# Patient Record
Sex: FEMALE | Race: WHITE | NOT HISPANIC OR LATINO
[De-identification: names, ages, dates, MRNs, and addresses within clinical notes are randomized per-mention and may not be internally consistent; named-entity substitution may affect disease eponyms.]

---

## 2018-09-14 ENCOUNTER — LABORATORY RESULT (OUTPATIENT)
Age: 38
End: 2018-09-14

## 2018-09-14 ENCOUNTER — APPOINTMENT (OUTPATIENT)
Dept: FAMILY MEDICINE | Facility: CLINIC | Age: 38
End: 2018-09-14
Payer: COMMERCIAL

## 2018-09-14 VITALS
OXYGEN SATURATION: 97 % | TEMPERATURE: 98.6 F | BODY MASS INDEX: 32.59 KG/M2 | WEIGHT: 198 LBS | HEART RATE: 84 BPM | RESPIRATION RATE: 14 BRPM | HEIGHT: 65.5 IN | DIASTOLIC BLOOD PRESSURE: 64 MMHG | SYSTOLIC BLOOD PRESSURE: 108 MMHG

## 2018-09-14 DIAGNOSIS — Z82.49 FAMILY HISTORY OF ISCHEMIC HEART DISEASE AND OTHER DISEASES OF THE CIRCULATORY SYSTEM: ICD-10-CM

## 2018-09-14 DIAGNOSIS — Z80.7 FAMILY HISTORY OF OTHER MALIGNANT NEOPLASMS OF LYMPHOID, HEMATOPOIETIC AND RELATED TISSUES: ICD-10-CM

## 2018-09-14 PROCEDURE — 99385 PREV VISIT NEW AGE 18-39: CPT

## 2018-09-14 NOTE — HEALTH RISK ASSESSMENT
[Patient reported mammogram was normal] : Patient reported mammogram was normal [Patient reported PAP Smear was normal] : Patient reported PAP Smear was normal [MammogramDate] : 08/18 [PapSmearDate] : 05/18

## 2018-09-14 NOTE — ASSESSMENT
[FreeTextEntry1] : hx iron defic anemia, family hx thalassemia - check cbc, iron studies, hemoglobin electrophoresis r/o thalassemia\par hx elev LFTs - check LFTs\par obesity - Advised lifestyle modifications for wt loss. Healthy diet and regular exercise regimen discussed w/ pt\par Healthy diet and regular exercise regimen discussed w/ pt.\par Screening labs ordered\par flu vaccine in the fall recommended\par Advised routine pap smear and mammogram\par Any questions call office

## 2018-09-14 NOTE — HISTORY OF PRESENT ILLNESS
[de-identified] : New patient to establish care. Pt in office for CPE. Pt has hx of phlebitis in LLE, s/p ablation of vein in L thigh 2017. Pt has been feeling well. Denies CP, palpitations, dyspnea, n/v. Pt has hx of iron defic anemia. Pt has hx of thalassemia in family but would like to be tested for it.\par Nonsmoker\par ETOH use social\par Drug use denies\par Exercises irregularly walking/yoga\par Diet poor high carb/portions\par Works as  [email or web address] \par , has 3 children

## 2018-09-17 LAB
25(OH)D3 SERPL-MCNC: 28.9 NG/ML
ALBUMIN SERPL ELPH-MCNC: 4.4 G/DL
ALP BLD-CCNC: 70 U/L
ALT SERPL-CCNC: 30 U/L
ANION GAP SERPL CALC-SCNC: 13 MMOL/L
APPEARANCE: CLEAR
AST SERPL-CCNC: 31 U/L
BACTERIA: NEGATIVE
BASOPHILS # BLD AUTO: 0.1 K/UL
BASOPHILS NFR BLD AUTO: 1.5 %
BILIRUB SERPL-MCNC: <0.2 MG/DL
BILIRUBIN URINE: NEGATIVE
BLOOD URINE: NEGATIVE
BUN SERPL-MCNC: 11 MG/DL
CALCIUM SERPL-MCNC: 9.3 MG/DL
CHLORIDE SERPL-SCNC: 103 MMOL/L
CHOLEST SERPL-MCNC: 172 MG/DL
CHOLEST/HDLC SERPL: 4.4 RATIO
CO2 SERPL-SCNC: 26 MMOL/L
COLOR: YELLOW
CREAT SERPL-MCNC: 0.67 MG/DL
EOSINOPHIL # BLD AUTO: 0.19 K/UL
EOSINOPHIL NFR BLD AUTO: 2.9 %
FERRITIN SERPL-MCNC: 4 NG/ML
FOLATE SERPL-MCNC: 11.6 NG/ML
GLUCOSE QUALITATIVE U: NEGATIVE MG/DL
GLUCOSE SERPL-MCNC: 83 MG/DL
HBA1C MFR BLD HPLC: 5.4 %
HCT VFR BLD CALC: 29.8 %
HDLC SERPL-MCNC: 39 MG/DL
HGB A MFR BLD: 98.1 %
HGB A2 MFR BLD: 1.9 %
HGB BLD-MCNC: 8.6 G/DL
HGB FRACT BLD-IMP: NORMAL
HYALINE CASTS: 2 /LPF
IMM GRANULOCYTES NFR BLD AUTO: 0.2 %
IRON SATN MFR SERPL: 4 %
IRON SERPL-MCNC: 17 UG/DL
KETONES URINE: NEGATIVE
LDLC SERPL CALC-MCNC: 108 MG/DL
LEUKOCYTE ESTERASE URINE: NEGATIVE
LYMPHOCYTES # BLD AUTO: 2.16 K/UL
LYMPHOCYTES NFR BLD AUTO: 33.1 %
MAN DIFF?: NORMAL
MCHC RBC-ENTMCNC: 20.4 PG
MCHC RBC-ENTMCNC: 28.9 GM/DL
MCV RBC AUTO: 70.6 FL
MICROSCOPIC-UA: NORMAL
MONOCYTES # BLD AUTO: 0.54 K/UL
MONOCYTES NFR BLD AUTO: 8.3 %
NEUTROPHILS # BLD AUTO: 3.53 K/UL
NEUTROPHILS NFR BLD AUTO: 54 %
NITRITE URINE: NEGATIVE
PH URINE: 5.5
PLATELET # BLD AUTO: 413 K/UL
POTASSIUM SERPL-SCNC: 4.3 MMOL/L
PROT SERPL-MCNC: 7.5 G/DL
PROTEIN URINE: NEGATIVE MG/DL
RBC # BLD: 4.22 M/UL
RBC # FLD: 17.4 %
RED BLOOD CELLS URINE: 2 /HPF
SODIUM SERPL-SCNC: 142 MMOL/L
SPECIFIC GRAVITY URINE: 1.01
SQUAMOUS EPITHELIAL CELLS: 20 /HPF
T4 FREE SERPL-MCNC: 1.1 NG/DL
TIBC SERPL-MCNC: 465 UG/DL
TRIGL SERPL-MCNC: 123 MG/DL
TSH SERPL-ACNC: 1.89 UIU/ML
UIBC SERPL-MCNC: 448 UG/DL
UROBILINOGEN URINE: NEGATIVE MG/DL
VIT B12 SERPL-MCNC: 584 PG/ML
WBC # FLD AUTO: 6.53 K/UL
WHITE BLOOD CELLS URINE: 5 /HPF

## 2019-07-08 ENCOUNTER — TRANSCRIPTION ENCOUNTER (OUTPATIENT)
Age: 39
End: 2019-07-08

## 2020-03-09 ENCOUNTER — RESULT REVIEW (OUTPATIENT)
Age: 40
End: 2020-03-09

## 2021-03-23 ENCOUNTER — NON-APPOINTMENT (OUTPATIENT)
Age: 41
End: 2021-03-23

## 2021-03-23 ENCOUNTER — APPOINTMENT (OUTPATIENT)
Dept: FAMILY MEDICINE | Facility: CLINIC | Age: 41
End: 2021-03-23
Payer: COMMERCIAL

## 2021-03-23 VITALS
HEIGHT: 65.5 IN | OXYGEN SATURATION: 98 % | SYSTOLIC BLOOD PRESSURE: 110 MMHG | WEIGHT: 208 LBS | BODY MASS INDEX: 34.24 KG/M2 | TEMPERATURE: 98 F | DIASTOLIC BLOOD PRESSURE: 70 MMHG | HEART RATE: 84 BPM

## 2021-03-23 PROCEDURE — 99396 PREV VISIT EST AGE 40-64: CPT | Mod: 25

## 2021-03-23 PROCEDURE — 96127 BRIEF EMOTIONAL/BEHAV ASSMT: CPT

## 2021-03-23 PROCEDURE — 99072 ADDL SUPL MATRL&STAF TM PHE: CPT

## 2021-03-23 PROCEDURE — 93000 ELECTROCARDIOGRAM COMPLETE: CPT | Mod: 59

## 2021-03-23 NOTE — ASSESSMENT
[FreeTextEntry1] : fe defic anemia - Check labs\par varicose veins - refer to vascular physician. advised use of compression stockings\par obesity - Advised lifestyle modifications for wt loss. Healthy diet and regular exercise regimen discussed w/ pt\par Healthy diet and regular exercise regimen discussed w/ pt.\par Screening labs ordered\par Advised routine pap smear and mammogram\par Any questions call office

## 2021-03-23 NOTE — HISTORY OF PRESENT ILLNESS
[de-identified] : Pt in office for CPE. Pt has been feeling well. Denies CP, palpitations, dyspnea, n/v. Pt has hx of iron defic anemia. Pt has hx of thalassemia in family. Hemoglobin electrophoresis showed decreased expression of hb A2 consistent w/ alpha thalassemia trait but could also be 2/2 her severe iron defic at that time.\par Pt has hx of phlebitis in LLE, s/p ablation of vein in L thigh 2017. Pt has varicose veins that she would like evaluated w/ a vascular dr.\par Nonsmoker\par ETOH use social\par Drug use denies\par Exercises regularly walking\par Diet poor high carb/portions/snacks\par Works as  [employer]\par , has 3 children

## 2021-03-23 NOTE — HEALTH RISK ASSESSMENT
[Patient reported mammogram was normal] : Patient reported mammogram was normal [Patient reported PAP Smear was normal] : Patient reported PAP Smear was normal [MammogramDate] : 03/20 [PapSmearDate] : 03/21

## 2021-03-26 ENCOUNTER — TRANSCRIPTION ENCOUNTER (OUTPATIENT)
Age: 41
End: 2021-03-26

## 2021-03-26 LAB
ALBUMIN SERPL ELPH-MCNC: 4.2 G/DL
ALP BLD-CCNC: 99 U/L
ALT SERPL-CCNC: 39 U/L
ANION GAP SERPL CALC-SCNC: 8 MMOL/L
AST SERPL-CCNC: 25 U/L
BASOPHILS # BLD AUTO: 0.08 K/UL
BASOPHILS NFR BLD AUTO: 1.3 %
BILIRUB SERPL-MCNC: <0.2 MG/DL
BUN SERPL-MCNC: 14 MG/DL
CALCIUM SERPL-MCNC: 8.9 MG/DL
CHLORIDE SERPL-SCNC: 106 MMOL/L
CHOLEST SERPL-MCNC: 156 MG/DL
CO2 SERPL-SCNC: 25 MMOL/L
CREAT SERPL-MCNC: 0.65 MG/DL
EOSINOPHIL # BLD AUTO: 0.25 K/UL
EOSINOPHIL NFR BLD AUTO: 4.1 %
ESTIMATED AVERAGE GLUCOSE: 120 MG/DL
FERRITIN SERPL-MCNC: 5 NG/ML
GLUCOSE SERPL-MCNC: 87 MG/DL
HBA1C MFR BLD HPLC: 5.8 %
HCT VFR BLD CALC: 31.1 %
HDLC SERPL-MCNC: 30 MG/DL
HGB BLD-MCNC: 9.2 G/DL
IMM GRANULOCYTES NFR BLD AUTO: 0.3 %
IRON SATN MFR SERPL: 5 %
IRON SERPL-MCNC: 19 UG/DL
LDLC SERPL CALC-MCNC: 89 MG/DL
LYMPHOCYTES # BLD AUTO: 1.95 K/UL
LYMPHOCYTES NFR BLD AUTO: 31.8 %
MAN DIFF?: NORMAL
MCHC RBC-ENTMCNC: 21.7 PG
MCHC RBC-ENTMCNC: 29.6 GM/DL
MCV RBC AUTO: 73.5 FL
MONOCYTES # BLD AUTO: 0.47 K/UL
MONOCYTES NFR BLD AUTO: 7.7 %
NEUTROPHILS # BLD AUTO: 3.36 K/UL
NEUTROPHILS NFR BLD AUTO: 54.8 %
NONHDLC SERPL-MCNC: 126 MG/DL
PLATELET # BLD AUTO: 352 K/UL
POTASSIUM SERPL-SCNC: 4.4 MMOL/L
PROT SERPL-MCNC: 6.6 G/DL
RBC # BLD: 4.23 M/UL
RBC # FLD: 16.8 %
SODIUM SERPL-SCNC: 139 MMOL/L
T4 FREE SERPL-MCNC: 1 NG/DL
TIBC SERPL-MCNC: 395 UG/DL
TRIGL SERPL-MCNC: 188 MG/DL
TSH SERPL-ACNC: 2.03 UIU/ML
UIBC SERPL-MCNC: 376 UG/DL
WBC # FLD AUTO: 6.13 K/UL

## 2021-04-09 ENCOUNTER — EMERGENCY (EMERGENCY)
Facility: HOSPITAL | Age: 41
LOS: 1 days | Discharge: HOME | End: 2021-04-09
Attending: EMERGENCY MEDICINE | Admitting: EMERGENCY MEDICINE
Payer: COMMERCIAL

## 2021-04-09 VITALS
HEART RATE: 73 BPM | SYSTOLIC BLOOD PRESSURE: 115 MMHG | OXYGEN SATURATION: 97 % | RESPIRATION RATE: 16 BRPM | TEMPERATURE: 99 F | DIASTOLIC BLOOD PRESSURE: 78 MMHG

## 2021-04-09 VITALS
TEMPERATURE: 98 F | RESPIRATION RATE: 16 BRPM | OXYGEN SATURATION: 99 % | SYSTOLIC BLOOD PRESSURE: 116 MMHG | HEIGHT: 65 IN | DIASTOLIC BLOOD PRESSURE: 79 MMHG | WEIGHT: 199.96 LBS | HEART RATE: 78 BPM

## 2021-04-09 DIAGNOSIS — Z98.89 OTHER SPECIFIED POSTPROCEDURAL STATES: Chronic | ICD-10-CM

## 2021-04-09 DIAGNOSIS — Q51.2 OTHER DOUBLING OF UTERUS: Chronic | ICD-10-CM

## 2021-04-09 DIAGNOSIS — Z87.42 PERSONAL HISTORY OF OTHER DISEASES OF THE FEMALE GENITAL TRACT: Chronic | ICD-10-CM

## 2021-04-09 LAB — HCG UR QL: NEGATIVE — SIGNIFICANT CHANGE UP

## 2021-04-09 PROCEDURE — 71046 X-RAY EXAM CHEST 2 VIEWS: CPT | Mod: 26

## 2021-04-09 PROCEDURE — 71046 X-RAY EXAM CHEST 2 VIEWS: CPT

## 2021-04-09 PROCEDURE — 72125 CT NECK SPINE W/O DYE: CPT | Mod: 26

## 2021-04-09 PROCEDURE — 72100 X-RAY EXAM L-S SPINE 2/3 VWS: CPT

## 2021-04-09 PROCEDURE — 72100 X-RAY EXAM L-S SPINE 2/3 VWS: CPT | Mod: 26

## 2021-04-09 PROCEDURE — 70450 CT HEAD/BRAIN W/O DYE: CPT | Mod: 26

## 2021-04-09 PROCEDURE — 81025 URINE PREGNANCY TEST: CPT

## 2021-04-09 PROCEDURE — 70450 CT HEAD/BRAIN W/O DYE: CPT

## 2021-04-09 PROCEDURE — 99285 EMERGENCY DEPT VISIT HI MDM: CPT

## 2021-04-09 PROCEDURE — 99284 EMERGENCY DEPT VISIT MOD MDM: CPT | Mod: 25

## 2021-04-09 PROCEDURE — 72125 CT NECK SPINE W/O DYE: CPT

## 2021-04-09 RX ORDER — IBUPROFEN 200 MG
600 TABLET ORAL ONCE
Refills: 0 | Status: COMPLETED | OUTPATIENT
Start: 2021-04-09 | End: 2021-04-09

## 2021-04-09 RX ADMIN — Medication 600 MILLIGRAM(S): at 16:00
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
## 2021-04-09 NOTE — ED PROVIDER NOTE - PATIENT PORTAL LINK FT
You can access the FollowMyHealth Patient Portal offered by [facility] by registering at the following website: [email or web address]. By joining [facility]’s FollowMyHealth portal, you will also be able to view your health information using other applications (apps) compatible with our system.
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
## 2021-04-09 NOTE — ED ADULT NURSE NOTE - PSH
H/O  section  2012  H/O one miscarriage  DVC 14 weeks gestation  H/O sinus surgery    H/O toe surgery  (L) great toe  Septate uterus
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
## 2021-04-09 NOTE — ED PROVIDER NOTE - CARE PROVIDER_API CALL
Hussain Salamanca  ORTHOPAEDIC SURGERY  [address] [facility], [address] [place] [address]  Phone: [phone]  Fax: [phone]  Follow Up Time: 1-3 Days
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
## 2021-04-20 ENCOUNTER — NON-APPOINTMENT (OUTPATIENT)
Age: 41
End: 2021-04-20

## 2021-04-20 ENCOUNTER — APPOINTMENT (OUTPATIENT)
Dept: FAMILY MEDICINE | Facility: CLINIC | Age: 41
End: 2021-04-20
Payer: COMMERCIAL

## 2021-04-20 VITALS
HEART RATE: 96 BPM | BODY MASS INDEX: 32.92 KG/M2 | TEMPERATURE: 98 F | SYSTOLIC BLOOD PRESSURE: 112 MMHG | DIASTOLIC BLOOD PRESSURE: 72 MMHG | WEIGHT: 200 LBS | OXYGEN SATURATION: 98 % | HEIGHT: 65.5 IN

## 2021-04-20 DIAGNOSIS — S13.4XXA SPRAIN OF LIGAMENTS OF CERVICAL SPINE, INITIAL ENCOUNTER: ICD-10-CM

## 2021-04-20 DIAGNOSIS — M54.2 CERVICALGIA: ICD-10-CM

## 2021-04-20 PROCEDURE — 99214 OFFICE O/P EST MOD 30 MIN: CPT

## 2021-04-20 PROCEDURE — 99213 OFFICE O/P EST LOW 20 MIN: CPT

## 2021-04-20 PROCEDURE — 99072 ADDL SUPL MATRL&STAF TM PHE: CPT
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
## 2021-04-20 NOTE — PHYSICAL EXAM
[No Lymphadenopathy] : no lymphadenopathy [Supple] : supple [No Focal Deficits] : no focal deficits [Grossly Normal Strength/Tone] : grossly normal strength/tone [Normal] : affect was normal and insight and judgment were intact [de-identified] : paraspinal muscle spasms L>R cervical. no cervical tenderness. neg spurling manuevers
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
## 2021-04-20 NOTE — PHYSICAL EXAM
[No Lymphadenopathy] : no lymphadenopathy [Supple] : supple [Normal] : affect was normal and insight and judgment were intact
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
## 2021-04-23 ENCOUNTER — APPOINTMENT (OUTPATIENT)
Dept: ULTRASOUND IMAGING | Facility: CLINIC | Age: 41
End: 2021-04-23
Payer: COMMERCIAL

## 2021-04-23 ENCOUNTER — OUTPATIENT (OUTPATIENT)
Dept: OUTPATIENT SERVICES | Facility: HOSPITAL | Age: 41
LOS: 1 days | End: 2021-04-23
Payer: COMMERCIAL

## 2021-04-23 DIAGNOSIS — Q51.2 OTHER DOUBLING OF UTERUS: Chronic | ICD-10-CM

## 2021-04-23 DIAGNOSIS — Z98.89 OTHER SPECIFIED POSTPROCEDURAL STATES: Chronic | ICD-10-CM

## 2021-04-23 DIAGNOSIS — Z00.8 ENCOUNTER FOR OTHER GENERAL EXAMINATION: ICD-10-CM

## 2021-04-23 DIAGNOSIS — Z87.42 PERSONAL HISTORY OF OTHER DISEASES OF THE FEMALE GENITAL TRACT: Chronic | ICD-10-CM

## 2021-04-23 PROCEDURE — 76536 US EXAM OF HEAD AND NECK: CPT | Mod: 26

## 2021-04-23 PROCEDURE — 76536 US EXAM OF HEAD AND NECK: CPT

## 2021-12-22 ENCOUNTER — TRANSCRIPTION ENCOUNTER (OUTPATIENT)
Age: 41
End: 2021-12-22

## 2022-05-27 ENCOUNTER — NON-APPOINTMENT (OUTPATIENT)
Age: 42
End: 2022-05-27

## 2022-08-02 ENCOUNTER — APPOINTMENT (OUTPATIENT)
Dept: FAMILY MEDICINE | Facility: CLINIC | Age: 42
End: 2022-08-02

## 2022-08-05 ENCOUNTER — NON-APPOINTMENT (OUTPATIENT)
Age: 42
End: 2022-08-05

## 2022-08-05 ENCOUNTER — APPOINTMENT (OUTPATIENT)
Dept: FAMILY MEDICINE | Facility: CLINIC | Age: 42
End: 2022-08-05

## 2022-08-05 VITALS
HEART RATE: 80 BPM | OXYGEN SATURATION: 98 % | TEMPERATURE: 97.6 F | HEIGHT: 65.5 IN | SYSTOLIC BLOOD PRESSURE: 110 MMHG | BODY MASS INDEX: 33.91 KG/M2 | WEIGHT: 206 LBS | DIASTOLIC BLOOD PRESSURE: 74 MMHG

## 2022-08-05 DIAGNOSIS — Z00.00 ENCOUNTER FOR GENERAL ADULT MEDICAL EXAMINATION W/OUT ABNORMAL FINDINGS: ICD-10-CM

## 2022-08-05 DIAGNOSIS — Z13.29 ENCOUNTER FOR SCREENING FOR OTHER SUSPECTED ENDOCRINE DISORDER: ICD-10-CM

## 2022-08-05 DIAGNOSIS — I83.93 ASYMPTOMATIC VARICOSE VEINS OF BILATERAL LOWER EXTREMITIES: ICD-10-CM

## 2022-08-05 DIAGNOSIS — Z13.228 ENCOUNTER FOR SCREENING FOR OTHER SUSPECTED ENDOCRINE DISORDER: ICD-10-CM

## 2022-08-05 DIAGNOSIS — Z13.220 ENCOUNTER FOR SCREENING FOR LIPOID DISORDERS: ICD-10-CM

## 2022-08-05 DIAGNOSIS — Z13.0 ENCOUNTER FOR SCREENING FOR OTHER SUSPECTED ENDOCRINE DISORDER: ICD-10-CM

## 2022-08-05 DIAGNOSIS — R79.89 OTHER SPECIFIED ABNORMAL FINDINGS OF BLOOD CHEMISTRY: ICD-10-CM

## 2022-08-05 PROCEDURE — 93000 ELECTROCARDIOGRAM COMPLETE: CPT

## 2022-08-05 PROCEDURE — 99396 PREV VISIT EST AGE 40-64: CPT | Mod: 25

## 2022-08-05 RX ORDER — CYCLOBENZAPRINE HYDROCHLORIDE 5 MG/1
5 TABLET, FILM COATED ORAL
Qty: 20 | Refills: 0 | Status: COMPLETED | COMMUNITY
Start: 2021-04-20 | End: 2022-08-05

## 2022-08-08 NOTE — HEALTH RISK ASSESSMENT
[Patient reported mammogram was normal] : Patient reported mammogram was normal [Patient reported PAP Smear was normal] : Patient reported PAP Smear was normal [MammogramDate] : 05/21 [PapSmearDate] : 05/21

## 2022-08-08 NOTE — ASSESSMENT
[FreeTextEntry1] : fe defic anemia - Check labs\par varicose veins - refer to vascular physician. advised use of compression stockings\par thyroid nodule - f/u with endo\par obesity - Advised lifestyle modifications for wt loss. Healthy diet and regular exercise regimen discussed w/ pt\par Healthy diet and regular exercise regimen discussed w/ pt. start qsymia q day. Possible adverse effects discussed with pt. f/u in 1 month\par Screening labs ordered\par Advised routine pap smear and mammogram\par Any questions call office

## 2022-08-08 NOTE — HISTORY OF PRESENT ILLNESS
[de-identified] : Pt in office for CPE. Pt has been feeling well. Denies CP, palpitations, dyspnea, n/v. Pt has hx of iron defic anemia and thalassemia. Hemoglobin electrophoresis showed decreased expression of hb A2 consistent w/ alpha thalassemia trait but could also be 2/2 her severe iron defic at that time.\par Pt has hx of thyroid nodule, FNA was wnl, pt follows with endo Dr. Montiel\par Pt has hx of phlebitis in LLE, s/p ablation of vein in L thigh 2017. Pt has varicose veins that she would like evaluated w/ a vascular doctor\par Pt having difficulty losing weight. Pt reports her diet has been controlled and she does exercise regularly.\par Pt had COVID infx in 05/2022\par Nonsmoker\par ETOH use social\par Drug use denies\par Exercises regularly walking\par Diet poor high carb/portions/snacks\par Works as  [employer]\par , has 3 children

## 2022-08-09 LAB
ALBUMIN SERPL ELPH-MCNC: 4.2 G/DL
ALP BLD-CCNC: 87 U/L
ALT SERPL-CCNC: 47 U/L
ANION GAP SERPL CALC-SCNC: 10 MMOL/L
APPEARANCE: CLEAR
AST SERPL-CCNC: 34 U/L
BACTERIA: NEGATIVE
BASOPHILS # BLD AUTO: 0.07 K/UL
BASOPHILS NFR BLD AUTO: 1.3 %
BILIRUB SERPL-MCNC: <0.2 MG/DL
BILIRUBIN URINE: NEGATIVE
BLOOD URINE: NEGATIVE
BUN SERPL-MCNC: 7 MG/DL
CALCIUM SERPL-MCNC: 8.8 MG/DL
CHLORIDE SERPL-SCNC: 105 MMOL/L
CHOLEST SERPL-MCNC: 168 MG/DL
CO2 SERPL-SCNC: 24 MMOL/L
COLOR: NORMAL
CREAT SERPL-MCNC: 0.62 MG/DL
EGFR: 114 ML/MIN/1.73M2
EOSINOPHIL # BLD AUTO: 0.15 K/UL
EOSINOPHIL NFR BLD AUTO: 2.7 %
ESTIMATED AVERAGE GLUCOSE: 114 MG/DL
FERRITIN SERPL-MCNC: 9 NG/ML
GLUCOSE QUALITATIVE U: NEGATIVE
GLUCOSE SERPL-MCNC: 94 MG/DL
HBA1C MFR BLD HPLC: 5.6 %
HCT VFR BLD CALC: 38.7 %
HDLC SERPL-MCNC: 39 MG/DL
HGB BLD-MCNC: 12.3 G/DL
HYALINE CASTS: 0 /LPF
IMM GRANULOCYTES NFR BLD AUTO: 0.4 %
IRON SATN MFR SERPL: 8 %
IRON SERPL-MCNC: 30 UG/DL
KETONES URINE: NEGATIVE
LDLC SERPL CALC-MCNC: 101 MG/DL
LEUKOCYTE ESTERASE URINE: NEGATIVE
LYMPHOCYTES # BLD AUTO: 1.64 K/UL
LYMPHOCYTES NFR BLD AUTO: 29.6 %
MAN DIFF?: NORMAL
MCHC RBC-ENTMCNC: 27.2 PG
MCHC RBC-ENTMCNC: 31.8 GM/DL
MCV RBC AUTO: 85.4 FL
MICROSCOPIC-UA: NORMAL
MONOCYTES # BLD AUTO: 0.44 K/UL
MONOCYTES NFR BLD AUTO: 7.9 %
NEUTROPHILS # BLD AUTO: 3.22 K/UL
NEUTROPHILS NFR BLD AUTO: 58.1 %
NITRITE URINE: NEGATIVE
NONHDLC SERPL-MCNC: 128 MG/DL
PH URINE: 7.5
PLATELET # BLD AUTO: 315 K/UL
POTASSIUM SERPL-SCNC: 4.5 MMOL/L
PROT SERPL-MCNC: 6.3 G/DL
PROTEIN URINE: NEGATIVE
RBC # BLD: 4.53 M/UL
RBC # FLD: 14.1 %
RED BLOOD CELLS URINE: 3 /HPF
SODIUM SERPL-SCNC: 139 MMOL/L
SPECIFIC GRAVITY URINE: 1.02
SQUAMOUS EPITHELIAL CELLS: 3 /HPF
T4 FREE SERPL-MCNC: 1 NG/DL
TIBC SERPL-MCNC: 378 UG/DL
TRIGL SERPL-MCNC: 136 MG/DL
TSH SERPL-ACNC: 2.24 UIU/ML
UIBC SERPL-MCNC: 348 UG/DL
UROBILINOGEN URINE: NORMAL
WBC # FLD AUTO: 5.54 K/UL
WHITE BLOOD CELLS URINE: 1 /HPF

## 2022-09-19 ENCOUNTER — APPOINTMENT (OUTPATIENT)
Dept: FAMILY MEDICINE | Facility: CLINIC | Age: 42
End: 2022-09-19

## 2022-09-19 VITALS
OXYGEN SATURATION: 98 % | TEMPERATURE: 97.7 F | BODY MASS INDEX: 31.65 KG/M2 | SYSTOLIC BLOOD PRESSURE: 106 MMHG | DIASTOLIC BLOOD PRESSURE: 80 MMHG | WEIGHT: 190 LBS | HEART RATE: 90 BPM | HEIGHT: 65 IN

## 2022-09-19 DIAGNOSIS — E66.9 OBESITY, UNSPECIFIED: ICD-10-CM

## 2022-09-19 PROCEDURE — 99213 OFFICE O/P EST LOW 20 MIN: CPT

## 2022-09-19 RX ORDER — PHENTERMINE AND TOPIRAMATE 3.75; 23 MG/1; MG/1
3.75-23 CAPSULE, EXTENDED RELEASE ORAL
Qty: 30 | Refills: 0 | Status: COMPLETED | COMMUNITY
Start: 2022-08-05 | End: 2022-09-19

## 2022-09-19 NOTE — ASSESSMENT
[FreeTextEntry1] : obesity - Encouraged continued lifestyle modifications including diet and exercise for weight loss. refill phentermine, topamax. f/u in 1 mo. if doing well and continuing to lose weight may then f/u  every 2-3 mos

## 2022-09-19 NOTE — HISTORY OF PRESENT ILLNESS
[de-identified] : Pt f/u obesity. Pt has started phentermine and topamax and tolerating meds well. Pt has lost 15 lbs in past month on medication and through better diet/exercise and also intermittent fasting. Denies any AE on medications.

## 2022-10-17 ENCOUNTER — APPOINTMENT (OUTPATIENT)
Dept: FAMILY MEDICINE | Facility: CLINIC | Age: 42
End: 2022-10-17

## 2022-10-17 VITALS
BODY MASS INDEX: 30.12 KG/M2 | WEIGHT: 183 LBS | SYSTOLIC BLOOD PRESSURE: 116 MMHG | OXYGEN SATURATION: 98 % | DIASTOLIC BLOOD PRESSURE: 75 MMHG | HEIGHT: 65.5 IN | TEMPERATURE: 97.3 F | HEART RATE: 91 BPM

## 2022-10-17 VITALS — DIASTOLIC BLOOD PRESSURE: 70 MMHG | SYSTOLIC BLOOD PRESSURE: 110 MMHG

## 2022-10-17 DIAGNOSIS — K64.4 RESIDUAL HEMORRHOIDAL SKIN TAGS: ICD-10-CM

## 2022-10-17 PROCEDURE — 99214 OFFICE O/P EST MOD 30 MIN: CPT

## 2022-10-17 RX ORDER — HYDROCORTISONE 25 MG/G
2.5 CREAM TOPICAL TWICE DAILY
Qty: 1 | Refills: 1 | Status: ACTIVE | COMMUNITY
Start: 2022-10-17 | End: 1900-01-01

## 2022-10-17 NOTE — PHYSICAL EXAM
[Normal] : affect was normal and insight and judgment were intact [Declined Rectal Exam] : declined rectal exam

## 2022-10-17 NOTE — ASSESSMENT
[FreeTextEntry1] : overweight - Encouraged continued lifestyle modifications including diet and exercise for weight loss. refill phentermine, topamax. f/u in 3 months for wt check\par hemorrhoid - topical hydrocortisone 2.5%. inc fiber and po fluids. if pain worsens see colorectal

## 2022-10-17 NOTE — HISTORY OF PRESENT ILLNESS
[de-identified] : Pt f/u obesity. Pt has started phentermine and topamax and tolerating meds well. Pt has lost add'l 7lbs in past month on medication and through better diet/exercise and also intermittent fasting. Denies any AE on medications.\par \par Pt also c/o hemorrhoid causing pain and discomfort in past 1.5 weeks. Pt using otc hemorrhoid cream w/ some improvement
Slurred speech

## 2022-12-19 ENCOUNTER — APPOINTMENT (OUTPATIENT)
Dept: FAMILY MEDICINE | Facility: CLINIC | Age: 42
End: 2022-12-19

## 2022-12-19 VITALS
HEIGHT: 65.5 IN | BODY MASS INDEX: 28.31 KG/M2 | TEMPERATURE: 97.3 F | OXYGEN SATURATION: 98 % | SYSTOLIC BLOOD PRESSURE: 128 MMHG | WEIGHT: 172 LBS | DIASTOLIC BLOOD PRESSURE: 70 MMHG | HEART RATE: 99 BPM

## 2022-12-19 DIAGNOSIS — M54.2 CERVICALGIA: ICD-10-CM

## 2022-12-19 PROCEDURE — 36415 COLL VENOUS BLD VENIPUNCTURE: CPT

## 2022-12-19 PROCEDURE — 99214 OFFICE O/P EST MOD 30 MIN: CPT | Mod: 25

## 2022-12-19 RX ORDER — CYCLOBENZAPRINE HYDROCHLORIDE 5 MG/1
5 TABLET, FILM COATED ORAL
Qty: 20 | Refills: 0 | Status: ACTIVE | COMMUNITY
Start: 2022-12-19 | End: 1900-01-01

## 2022-12-20 LAB
T4 FREE SERPL-MCNC: 1.1 NG/DL
TSH SERPL-ACNC: 1.94 UIU/ML

## 2022-12-20 NOTE — PHYSICAL EXAM
[Declined Rectal Exam] : declined rectal exam [Normal] : affect was normal and insight and judgment were intact [de-identified] : neck pain/spasms

## 2022-12-20 NOTE — HISTORY OF PRESENT ILLNESS
[de-identified] : Pt f/u obesity. Pt has started phentermine and topamax and tolerating meds well. Pt has lost add'l 11lbs in past 2 month on medication and through better diet/exercise and also intermittent fasting. Denies any AE on medications.\par Pt has hx of thyroid nodule, does US through endocrinologist will f/u with endo next month. \par Pt has family hx of CAD. Pt would like to evaluated by cardio.

## 2022-12-20 NOTE — ASSESSMENT
[FreeTextEntry1] : overweight - Encouraged continued lifestyle modifications including diet and exercise for weight loss. refill phentermine, topamax. f/u in 3 months for wt check\par superficial phlebitis - refer to vascular\par thyroid nodule - f/u endo for US. check tfts\par neck pain - start PT, flexeril qhs prn\par thyroid nodule - check TFTs

## 2023-01-23 ENCOUNTER — APPOINTMENT (OUTPATIENT)
Dept: CARDIOLOGY | Facility: CLINIC | Age: 43
End: 2023-01-23
Payer: COMMERCIAL

## 2023-01-23 ENCOUNTER — NON-APPOINTMENT (OUTPATIENT)
Age: 43
End: 2023-01-23

## 2023-01-23 VITALS
SYSTOLIC BLOOD PRESSURE: 118 MMHG | DIASTOLIC BLOOD PRESSURE: 72 MMHG | HEART RATE: 90 BPM | WEIGHT: 175 LBS | OXYGEN SATURATION: 100 % | BODY MASS INDEX: 28.81 KG/M2 | HEIGHT: 65.5 IN

## 2023-01-23 DIAGNOSIS — I80.9 PHLEBITIS AND THROMBOPHLEBITIS OF UNSPECIFIED SITE: ICD-10-CM

## 2023-01-23 PROCEDURE — 99204 OFFICE O/P NEW MOD 45 MIN: CPT | Mod: 25

## 2023-01-23 PROCEDURE — 93000 ELECTROCARDIOGRAM COMPLETE: CPT

## 2023-01-23 NOTE — DISCUSSION/SUMMARY
[FreeTextEntry1] : Pt is a 41 y/o F who is referred here today by their PCP for evaluation.  At age 35 she had phlebitis while pregnancy, f/u with vasc and had procedure.  She recently saw vascular, history of varicose veins.  \par Pt reports feeling well and has no active cardiac complaints - denies CP, SOB, palpitations, dizziness, syncope, edema, orthopnea, PND, orthopnea.  No exertional symptoms.   \par Will check transthoracic echocardiogram to evaluate left ventricular function and assess for any structural abnormalities\par will perform ETT to assess patient's current cardiac reserve to incremental activity and check for provocable ECG changes.\par VSS\par The described plan was discussed with the pt.  All questions and concerns were addressed to the best of my knowledge.

## 2023-01-27 ENCOUNTER — APPOINTMENT (OUTPATIENT)
Dept: CARDIOLOGY | Facility: CLINIC | Age: 43
End: 2023-01-27
Payer: COMMERCIAL

## 2023-01-27 ENCOUNTER — APPOINTMENT (OUTPATIENT)
Dept: COLORECTAL SURGERY | Facility: CLINIC | Age: 43
End: 2023-01-27

## 2023-01-27 PROCEDURE — 93306 TTE W/DOPPLER COMPLETE: CPT

## 2023-02-15 ENCOUNTER — APPOINTMENT (OUTPATIENT)
Dept: CARDIOLOGY | Facility: CLINIC | Age: 43
End: 2023-02-15
Payer: COMMERCIAL

## 2023-02-15 PROCEDURE — 93015 CV STRESS TEST SUPVJ I&R: CPT

## 2023-03-27 ENCOUNTER — APPOINTMENT (OUTPATIENT)
Dept: FAMILY MEDICINE | Facility: CLINIC | Age: 43
End: 2023-03-27
Payer: COMMERCIAL

## 2023-03-27 VITALS
HEIGHT: 65.5 IN | OXYGEN SATURATION: 98 % | WEIGHT: 175 LBS | TEMPERATURE: 97.3 F | SYSTOLIC BLOOD PRESSURE: 117 MMHG | DIASTOLIC BLOOD PRESSURE: 75 MMHG | BODY MASS INDEX: 28.81 KG/M2 | HEART RATE: 92 BPM

## 2023-03-27 DIAGNOSIS — Z87.42 PERSONAL HISTORY OF OTHER DISEASES OF THE FEMALE GENITAL TRACT: ICD-10-CM

## 2023-03-27 PROCEDURE — 99214 OFFICE O/P EST MOD 30 MIN: CPT

## 2023-03-27 NOTE — ASSESSMENT
[FreeTextEntry1] : overweight - Encouraged continued lifestyle modifications including diet and exercise for weight loss. inc phentermine to 37.5mg q day,refill topamax. f/u in 3 months for wt check\par vit D defc- Check labs\par fe defic anemia - cbc, fe studies

## 2023-03-27 NOTE — HISTORY OF PRESENT ILLNESS
[de-identified] : Pt f/u overweight. Pt has started phentermine and topamax and tolerating meds well. Pt has hit a plateau in wt loss on medication. Pt still consistent w/ better diet/exercise and also intermittent fasting. Exercising several days/week and doing pilates. Denies any AE on medications.\par Pt also has frequent issues w/ dentition, has hx of vit D defic due to recheck\par Pt f/u anemia now supplementing when she has menses due to recheck cbc, fe.

## 2023-04-04 LAB
25(OH)D3 SERPL-MCNC: 19 NG/ML
ALBUMIN SERPL ELPH-MCNC: 4.6 G/DL
ALP BLD-CCNC: 87 U/L
ALT SERPL-CCNC: 34 U/L
ANION GAP SERPL CALC-SCNC: 13 MMOL/L
AST SERPL-CCNC: 29 U/L
BASOPHILS # BLD AUTO: 0.09 K/UL
BASOPHILS NFR BLD AUTO: 1.9 %
BILIRUB SERPL-MCNC: 0.3 MG/DL
BUN SERPL-MCNC: 12 MG/DL
CALCIUM SERPL-MCNC: 9.5 MG/DL
CHLORIDE SERPL-SCNC: 105 MMOL/L
CO2 SERPL-SCNC: 22 MMOL/L
CREAT SERPL-MCNC: 0.81 MG/DL
EGFR: 93 ML/MIN/1.73M2
EOSINOPHIL # BLD AUTO: 0.1 K/UL
EOSINOPHIL NFR BLD AUTO: 2.1 %
ESTIMATED AVERAGE GLUCOSE: 120 MG/DL
FERRITIN SERPL-MCNC: 8 NG/ML
GLUCOSE SERPL-MCNC: 95 MG/DL
HBA1C MFR BLD HPLC: 5.8 %
HCT VFR BLD CALC: 38 %
HGB BLD-MCNC: 11.6 G/DL
IMM GRANULOCYTES NFR BLD AUTO: 0.2 %
IRON SATN MFR SERPL: 17 %
IRON SERPL-MCNC: 75 UG/DL
LYMPHOCYTES # BLD AUTO: 1.75 K/UL
LYMPHOCYTES NFR BLD AUTO: 36.5 %
MAN DIFF?: NORMAL
MCHC RBC-ENTMCNC: 25.9 PG
MCHC RBC-ENTMCNC: 30.5 GM/DL
MCV RBC AUTO: 84.8 FL
MONOCYTES # BLD AUTO: 0.35 K/UL
MONOCYTES NFR BLD AUTO: 7.3 %
NEUTROPHILS # BLD AUTO: 2.5 K/UL
NEUTROPHILS NFR BLD AUTO: 52 %
PLATELET # BLD AUTO: 344 K/UL
POTASSIUM SERPL-SCNC: 4.3 MMOL/L
PROT SERPL-MCNC: 7 G/DL
RBC # BLD: 4.48 M/UL
RBC # FLD: 15.1 %
SODIUM SERPL-SCNC: 140 MMOL/L
TIBC SERPL-MCNC: 432 UG/DL
UIBC SERPL-MCNC: 357 UG/DL
WBC # FLD AUTO: 4.8 K/UL

## 2023-08-29 ENCOUNTER — APPOINTMENT (OUTPATIENT)
Dept: FAMILY MEDICINE | Facility: CLINIC | Age: 43
End: 2023-08-29
Payer: COMMERCIAL

## 2023-08-29 VITALS
OXYGEN SATURATION: 98 % | HEIGHT: 65.5 IN | SYSTOLIC BLOOD PRESSURE: 118 MMHG | TEMPERATURE: 97.2 F | DIASTOLIC BLOOD PRESSURE: 70 MMHG | WEIGHT: 169 LBS | HEART RATE: 100 BPM | BODY MASS INDEX: 27.82 KG/M2

## 2023-08-29 PROCEDURE — 99214 OFFICE O/P EST MOD 30 MIN: CPT

## 2023-08-29 NOTE — ASSESSMENT
[FreeTextEntry1] : overweight - Encouraged continued lifestyle modifications including diet and exercise for weight loss. refill phentermine to 37.5mg q day,refill topamax. f/u in 3 months for wt check vit D defc- Check labs fe defic anemia - cbc, fe studies thyroid nodule - pt to f/u with endo, check TFTs

## 2023-08-29 NOTE — HISTORY OF PRESENT ILLNESS
[de-identified] : Pt f/u overweight. Pt has been on phentermine and topamax and tolerating meds well. Pt now consistent w/ better diet/exercise and also intermittent fasting. Exercising several days/week walking and doing pilates. Denies any AE on medications. Pt also has frequent issues w/ dentition, has hx of vit D defic due to recheck. Pt on vit D 50,000IU Pt f/u anemia now supplementing when she has menses due to recheck cbc, fe.

## 2023-09-02 ENCOUNTER — OFFICE (OUTPATIENT)
Dept: URBAN - METROPOLITAN AREA CLINIC 12 | Facility: CLINIC | Age: 43
Setting detail: OPHTHALMOLOGY
End: 2023-09-02
Payer: COMMERCIAL

## 2023-09-02 DIAGNOSIS — H00.024: ICD-10-CM

## 2023-09-02 PROCEDURE — 99213 OFFICE O/P EST LOW 20 MIN: CPT | Performed by: OPTOMETRIST

## 2023-09-02 ASSESSMENT — CONFRONTATIONAL VISUAL FIELD TEST (CVF)
OD_FINDINGS: FULL
OS_FINDINGS: FULL

## 2023-09-02 ASSESSMENT — SUPERFICIAL PUNCTATE KERATITIS (SPK)
OD_SPK: T
OS_SPK: T

## 2023-09-02 ASSESSMENT — LID EXAM ASSESSMENTS
OD_BLEPHARITIS: 2+
OS_BLEPHARITIS: 2+

## 2023-09-02 ASSESSMENT — VISUAL ACUITY
OS_BCVA: 20/20-1
OD_BCVA: 20/20-3

## 2023-09-02 ASSESSMENT — KERATOMETRY: METHOD_AUTO_MANUAL: AUTO

## 2023-09-05 LAB
25(OH)D3 SERPL-MCNC: 41 NG/ML
BASOPHILS # BLD AUTO: 0.08 K/UL
BASOPHILS NFR BLD AUTO: 1.6 %
EOSINOPHIL # BLD AUTO: 0.16 K/UL
EOSINOPHIL NFR BLD AUTO: 3.3 %
FERRITIN SERPL-MCNC: 5 NG/ML
HCT VFR BLD CALC: 34.4 %
HGB BLD-MCNC: 10.1 G/DL
IMM GRANULOCYTES NFR BLD AUTO: 0.4 %
IRON SATN MFR SERPL: 6 %
IRON SERPL-MCNC: 24 UG/DL
LYMPHOCYTES # BLD AUTO: 1.58 K/UL
LYMPHOCYTES NFR BLD AUTO: 32.5 %
MAN DIFF?: NORMAL
MCHC RBC-ENTMCNC: 23 PG
MCHC RBC-ENTMCNC: 29.4 GM/DL
MCV RBC AUTO: 78.4 FL
MONOCYTES # BLD AUTO: 0.41 K/UL
MONOCYTES NFR BLD AUTO: 8.4 %
NEUTROPHILS # BLD AUTO: 2.61 K/UL
NEUTROPHILS NFR BLD AUTO: 53.8 %
PLATELET # BLD AUTO: 298 K/UL
RBC # BLD: 4.39 M/UL
RBC # FLD: 15.8 %
T4 FREE SERPL-MCNC: 1.1 NG/DL
TIBC SERPL-MCNC: 388 UG/DL
TSH SERPL-ACNC: 1.82 UIU/ML
UIBC SERPL-MCNC: 364 UG/DL
WBC # FLD AUTO: 4.86 K/UL

## 2023-09-07 RX ORDER — FERROUS SULFATE TAB EC 325 MG (65 MG FE EQUIVALENT) 325 (65 FE) MG
325 (65 FE) TABLET DELAYED RESPONSE ORAL
Qty: 180 | Refills: 0 | Status: ACTIVE | COMMUNITY
Start: 2018-09-17 | End: 1900-01-01

## 2023-09-23 ENCOUNTER — RX ONLY (RX ONLY)
Age: 43
End: 2023-09-23

## 2023-09-23 ENCOUNTER — OFFICE (OUTPATIENT)
Dept: URBAN - METROPOLITAN AREA CLINIC 12 | Facility: CLINIC | Age: 43
Setting detail: OPHTHALMOLOGY
End: 2023-09-23
Payer: COMMERCIAL

## 2023-09-23 DIAGNOSIS — H00.025: ICD-10-CM

## 2023-09-23 DIAGNOSIS — J01.90: ICD-10-CM

## 2023-09-23 PROBLEM — H01.00 BLEPHARITIS; RIGHT UPPER LID, LEFT UPPER LID: Status: ACTIVE | Noted: 2023-09-02

## 2023-09-23 PROCEDURE — 99213 OFFICE O/P EST LOW 20 MIN: CPT | Performed by: OPTOMETRIST

## 2023-09-23 ASSESSMENT — TONOMETRY
OS_IOP_MMHG: 18
OD_IOP_MMHG: 20

## 2023-09-23 ASSESSMENT — CONFRONTATIONAL VISUAL FIELD TEST (CVF)
OS_FINDINGS: FULL
OD_FINDINGS: FULL

## 2023-09-23 ASSESSMENT — LID EXAM ASSESSMENTS
OS_BLEPHARITIS: 2+
OD_BLEPHARITIS: 2+

## 2023-09-23 ASSESSMENT — KERATOMETRY
OD_K1POWER_DIOPTERS: 43.50
OS_K2POWER_DIOPTERS: 45.00
OS_AXISANGLE_DEGREES: 099
OD_K2POWER_DIOPTERS: 45.00
OD_AXISANGLE_DEGREES: 083
METHOD_AUTO_MANUAL: AUTO
OS_K1POWER_DIOPTERS: 43.25

## 2023-09-23 ASSESSMENT — REFRACTION_AUTOREFRACTION
OD_AXIS: 003
OS_CYLINDER: -0.75
OD_CYLINDER: -1.25
OD_SPHERE: -0.25
OS_SPHERE: PLANO
OS_AXIS: 011

## 2023-09-23 ASSESSMENT — AXIALLENGTH_DERIVED: OD_AL: 23.6575

## 2023-09-23 ASSESSMENT — SUPERFICIAL PUNCTATE KERATITIS (SPK)
OS_SPK: T
OD_SPK: T

## 2023-09-23 ASSESSMENT — VISUAL ACUITY
OS_BCVA: 20/25-2
OD_BCVA: 20/20-2

## 2023-09-23 ASSESSMENT — SPHEQUIV_DERIVED: OD_SPHEQUIV: -0.875

## 2023-10-01 ENCOUNTER — OFFICE (OUTPATIENT)
Dept: URBAN - METROPOLITAN AREA CLINIC 12 | Facility: CLINIC | Age: 43
Setting detail: OPHTHALMOLOGY
End: 2023-10-01
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
## 2023-10-01 PROCEDURE — NO SHOW FE NO SHOW FEE: Performed by: OPTOMETRIST

## 2023-10-10 ENCOUNTER — OFFICE (OUTPATIENT)
Dept: URBAN - METROPOLITAN AREA CLINIC 88 | Facility: CLINIC | Age: 43
Setting detail: OPHTHALMOLOGY
End: 2023-10-10
Payer: COMMERCIAL

## 2023-10-10 DIAGNOSIS — H10.433: ICD-10-CM

## 2023-10-10 DIAGNOSIS — H00.025: ICD-10-CM

## 2023-10-10 DIAGNOSIS — H04.123: ICD-10-CM

## 2023-10-10 DIAGNOSIS — J01.90: ICD-10-CM

## 2023-10-10 PROCEDURE — 92012 INTRM OPH EXAM EST PATIENT: CPT | Performed by: OPTOMETRIST

## 2023-10-10 ASSESSMENT — KERATOMETRY
OS_AXISANGLE_DEGREES: 097
OS_K2POWER_DIOPTERS: 45.00
OD_K1POWER_DIOPTERS: 43.25
OD_K2POWER_DIOPTERS: 45.00
OD_AXISANGLE_DEGREES: 080
METHOD_AUTO_MANUAL: AUTO
OS_K1POWER_DIOPTERS: 43.25

## 2023-10-10 ASSESSMENT — TONOMETRY
OS_IOP_MMHG: 20
OD_IOP_MMHG: 18

## 2023-10-10 ASSESSMENT — SUPERFICIAL PUNCTATE KERATITIS (SPK)
OS_SPK: T
OD_SPK: T

## 2023-10-10 ASSESSMENT — REFRACTION_AUTOREFRACTION
OD_CYLINDER: -1.25
OS_SPHERE: PLANO
OD_AXIS: 001
OS_CYLINDER: -1.50
OD_SPHERE: PLANO
OS_AXIS: 008

## 2023-10-10 ASSESSMENT — LID EXAM ASSESSMENTS
OS_BLEPHARITIS: 2+
OD_BLEPHARITIS: 2+

## 2023-10-10 ASSESSMENT — VISUAL ACUITY
OS_BCVA: 20/25-
OD_BCVA: 20/30-

## 2023-10-10 ASSESSMENT — CONFRONTATIONAL VISUAL FIELD TEST (CVF)
OS_FINDINGS: FULL
OD_FINDINGS: FULL

## 2023-10-13 ENCOUNTER — RX RENEWAL (OUTPATIENT)
Age: 43
End: 2023-10-13

## 2023-11-17 ENCOUNTER — NON-APPOINTMENT (OUTPATIENT)
Age: 43
End: 2023-11-17

## 2024-01-03 ENCOUNTER — RX RENEWAL (OUTPATIENT)
Age: 44
End: 2024-01-03

## 2024-01-04 RX ORDER — CHOLECALCIFEROL (VITAMIN D3) 1250 MCG
1.25 MG CAPSULE ORAL
Qty: 12 | Refills: 0 | Status: ACTIVE | COMMUNITY
Start: 2023-04-04 | End: 1900-01-01

## 2024-04-04 ENCOUNTER — APPOINTMENT (OUTPATIENT)
Dept: FAMILY MEDICINE | Facility: CLINIC | Age: 44
End: 2024-04-04
Payer: COMMERCIAL

## 2024-04-04 VITALS
SYSTOLIC BLOOD PRESSURE: 124 MMHG | HEIGHT: 65 IN | DIASTOLIC BLOOD PRESSURE: 76 MMHG | TEMPERATURE: 97.2 F | WEIGHT: 179 LBS | BODY MASS INDEX: 29.82 KG/M2 | OXYGEN SATURATION: 97 % | HEART RATE: 96 BPM

## 2024-04-04 DIAGNOSIS — R14.0 ABDOMINAL DISTENSION (GASEOUS): ICD-10-CM

## 2024-04-04 DIAGNOSIS — M79.642 PAIN IN LEFT HAND: ICD-10-CM

## 2024-04-04 DIAGNOSIS — E66.3 OVERWEIGHT: ICD-10-CM

## 2024-04-04 DIAGNOSIS — E55.9 VITAMIN D DEFICIENCY, UNSPECIFIED: ICD-10-CM

## 2024-04-04 DIAGNOSIS — E04.1 NONTOXIC SINGLE THYROID NODULE: ICD-10-CM

## 2024-04-04 DIAGNOSIS — D50.9 IRON DEFICIENCY ANEMIA, UNSPECIFIED: ICD-10-CM

## 2024-04-04 PROCEDURE — 99214 OFFICE O/P EST MOD 30 MIN: CPT

## 2024-04-06 NOTE — HISTORY OF PRESENT ILLNESS
[de-identified] : Pt f/u overweight. Pt has been on phentermine and topamax and tolerating meds well. Pt had been off meds for a few months. Pt now consistent w/ better diet/exercise and also intermittent fasting. Exercising several days/week walking and doing pilates. Denies any AE on medications. Pt also has frequent issues w/ dentition, has hx of vit D defic due to recheck. Pt on vit D 50,000IU Pt f/u anemia now supplementing when she has menses due to recheck cbc, fe. Pt's ferritin was low in 01/2024 5.7. Pt now supplementing Fe bid for past 2 months.

## 2024-04-06 NOTE — ASSESSMENT
[FreeTextEntry1] : overweight - Encouraged continued lifestyle modifications including diet and exercise for weight loss. restart phentermine at 30mg q day as she ahs been off meds for a few months. refill topamax. f/u in 2-3 months for wt check vit D defic- Check labs fe defic anemia - cbc, fe studies thyroid nodule - pt to f/u with endo, check TFTs

## 2024-04-25 PROBLEM — H43.393 VITREOUS FLOATERS; BOTH EYES: Status: ACTIVE | Noted: 2024-04-25

## 2024-06-10 RX ORDER — TOPIRAMATE 25 MG/1
25 TABLET, FILM COATED ORAL
Qty: 30 | Refills: 0 | Status: ACTIVE | COMMUNITY
Start: 2022-08-12 | End: 1900-01-01

## 2024-06-10 RX ORDER — PHENTERMINE HYDROCHLORIDE 30 MG/1
30 CAPSULE ORAL
Qty: 30 | Refills: 0 | Status: ACTIVE | COMMUNITY
Start: 2022-08-12 | End: 1900-01-01

## 2024-08-05 ENCOUNTER — APPOINTMENT (OUTPATIENT)
Dept: FAMILY MEDICINE | Facility: CLINIC | Age: 44
End: 2024-08-05

## 2024-08-05 PROBLEM — R53.83 FATIGUE, UNSPECIFIED TYPE: Status: ACTIVE | Noted: 2024-08-05

## 2024-08-05 PROCEDURE — 99214 OFFICE O/P EST MOD 30 MIN: CPT

## 2024-08-05 NOTE — ASSESSMENT
[FreeTextEntry1] : obesity - Encouraged continued lifestyle modifications including diet and exercise for weight loss. restart phentermine at 30mg q day as she has been off meds for a few months. refill topamax. f/u in 2-3 months for wt check vit D defic- Check labs fe defic anemia - cbc, fe studies thyroid nodule - pt to f/u with endo, check TFTs fatigue - Check labs

## 2024-08-05 NOTE — HISTORY OF PRESENT ILLNESS
[de-identified] : Pt f/u obesity. Pt has been on phentermine and topamax and tolerating meds well. Pt had been off meds for a few months. Pt has regained weight as she has been overeating unhealthy fried foods. Pt still exercising several days/week walking and doing pilates. Denies any AE on medications. Pt would like to restart meds Pt c/o increased fatigue in past few months.Pt does camp often may have been bitten by a tick. pt is stressed recently  Pt also has frequent issues w/ dentition, has hx of vit D defic due to recheck. Pt on vit D 50,000IU Pt f/u anemia now supplementing when she has menses due to recheck cbc, fe. Pt's ferritin was low in 01/2024 5.7. Pt now supplementing Fe bid for past week

## 2024-11-20 ENCOUNTER — TRANSCRIPTION ENCOUNTER (OUTPATIENT)
Age: 44
End: 2024-11-20

## 2024-12-18 ENCOUNTER — TRANSCRIPTION ENCOUNTER (OUTPATIENT)
Age: 44
End: 2024-12-18

## 2025-01-22 ENCOUNTER — OFFICE (OUTPATIENT)
Dept: URBAN - METROPOLITAN AREA CLINIC 100 | Facility: CLINIC | Age: 45
Setting detail: OPHTHALMOLOGY
End: 2025-01-22
Payer: COMMERCIAL

## 2025-01-22 ENCOUNTER — RX ONLY (RX ONLY)
Age: 45
End: 2025-01-22

## 2025-01-22 DIAGNOSIS — H04.123: ICD-10-CM

## 2025-01-22 DIAGNOSIS — H10.433: ICD-10-CM

## 2025-01-22 DIAGNOSIS — H01.001: ICD-10-CM

## 2025-01-22 DIAGNOSIS — H01.004: ICD-10-CM

## 2025-01-22 PROCEDURE — 92014 COMPRE OPH EXAM EST PT 1/>: CPT | Performed by: OPHTHALMOLOGY

## 2025-01-22 ASSESSMENT — CONFRONTATIONAL VISUAL FIELD TEST (CVF)
OD_FINDINGS: FULL
OS_FINDINGS: FULL

## 2025-01-22 ASSESSMENT — REFRACTION_AUTOREFRACTION
OD_CYLINDER: -1.00
OS_AXIS: 008
OS_CYLINDER: -1.50
OS_SPHERE: PLANO
OD_SPHERE: -0.25
OD_AXIS: 177

## 2025-01-22 ASSESSMENT — KERATOMETRY
OS_K2POWER_DIOPTERS: 44.75
OD_K1POWER_DIOPTERS: 43.00
METHOD_AUTO_MANUAL: AUTO
OD_AXISANGLE_DEGREES: 084
OS_K1POWER_DIOPTERS: 43.00
OS_AXISANGLE_DEGREES: 101
OD_K2POWER_DIOPTERS: 44.50

## 2025-01-22 ASSESSMENT — REFRACTION_MANIFEST
OD_CYLINDER: -0.75
OS_SPHERE: -0.25
OS_AXIS: 005
OS_VA1: 20/20-2
OD_SPHERE: -0.25
OS_CYLINDER: -1.50
OD_AXIS: 175
OD_VA1: 20/25-2

## 2025-01-22 ASSESSMENT — LID EXAM ASSESSMENTS
OD_BLEPHARITIS: 1+
OS_BLEPHARITIS: 1+

## 2025-01-22 ASSESSMENT — TONOMETRY
OS_IOP_MMHG: 17
OD_IOP_MMHG: 16

## 2025-01-22 ASSESSMENT — SUPERFICIAL PUNCTATE KERATITIS (SPK)
OS_SPK: T
OD_SPK: T

## 2025-01-22 ASSESSMENT — VISUAL ACUITY
OS_BCVA: 20/50+2
OD_BCVA: 20/20

## 2025-02-24 ENCOUNTER — APPOINTMENT (OUTPATIENT)
Dept: FAMILY MEDICINE | Facility: CLINIC | Age: 45
End: 2025-02-24
Payer: COMMERCIAL

## 2025-02-24 VITALS
HEIGHT: 65 IN | OXYGEN SATURATION: 98 % | DIASTOLIC BLOOD PRESSURE: 78 MMHG | BODY MASS INDEX: 33.99 KG/M2 | HEART RATE: 85 BPM | TEMPERATURE: 98.2 F | SYSTOLIC BLOOD PRESSURE: 126 MMHG | WEIGHT: 204 LBS

## 2025-02-24 DIAGNOSIS — Z01.84 ENCOUNTER FOR ANTIBODY RESPONSE EXAMINATION: ICD-10-CM

## 2025-02-24 DIAGNOSIS — D50.9 IRON DEFICIENCY ANEMIA, UNSPECIFIED: ICD-10-CM

## 2025-02-24 DIAGNOSIS — E66.9 OBESITY, UNSPECIFIED: ICD-10-CM

## 2025-02-24 DIAGNOSIS — E04.1 NONTOXIC SINGLE THYROID NODULE: ICD-10-CM

## 2025-02-24 DIAGNOSIS — Z23 ENCOUNTER FOR IMMUNIZATION: ICD-10-CM

## 2025-02-24 PROCEDURE — 99214 OFFICE O/P EST MOD 30 MIN: CPT

## 2025-03-03 RX ORDER — TIRZEPATIDE 2.5 MG/.5ML
2.5 INJECTION, SOLUTION SUBCUTANEOUS
Qty: 1 | Refills: 1 | Status: ACTIVE | COMMUNITY
Start: 2025-02-24

## 2025-03-17 NOTE — ED ADULT NURSE NOTE - CAS TRG GEN SKIN CONDITION
Detail Level: Generalized Detail Level: Zone [place]levi Counseling:  I discussed with the patient the risks of topical clascoterone including but not limited to erythema, scaling, itching, and stinging. Patient voiced their understanding. Aklief counseling:  Patient advised to apply a pea-sized amount only at bedtime and wait 30 minutes after washing their face before applying.  If too drying, patient may add a non-comedogenic moisturizer.  The most commonly reported side effects including irritation, redness, scaling, dryness, stinging, burning, itching, and increased risk of sunburn.  The patient verbalized understanding of the proper use and possible adverse effects of retinoids.  All of the patient's questions and concerns were addressed. Topical Retinoid Pregnancy And Lactation Text: This medication is Pregnancy Category C. It is unknown if this medication is excreted in breast milk. Include Pregnancy/Lactation Warning?: No Azithromycin Pregnancy And Lactation Text: This medication is considered safe during pregnancy and is also secreted in breast milk. Erythromycin Counseling:  I discussed with the patient the risks of erythromycin including but not limited to GI upset, allergic reaction, drug rash, diarrhea, increase in liver enzymes, and yeast infections. Minocycline Pregnancy And Lactation Text: This medication is Pregnancy Category D and not consider safe during pregnancy. It is also excreted in breast milk. Bactrim Pregnancy And Lactation Text: This medication is Pregnancy Category D and is known to cause fetal risk.  It is also excreted in breast milk. Warm Patient Specific Counseling (Will Not Stick From Patient To Patient): Recommended otc panoxyl Topical Sulfur Applications Counseling: Topical Sulfur Counseling: Patient counseled that this medication may cause skin irritation or allergic reactions.  In the event of skin irritation, the patient was advised to reduce the amount of the drug applied or use it less frequently.   The patient verbalized understanding of the proper use and possible adverse effects of topical sulfur application.  All of the patient's questions and concerns were addressed. Doxycycline Counseling:  Patient counseled regarding possible photosensitivity and increased risk for sunburn.  Patient instructed to avoid sunlight, if possible.  When exposed to sunlight, patients should wear protective clothing, sunglasses, and sunscreen.  The patient was instructed to call the office immediately if the following severe adverse effects occur:  hearing changes, easy bruising/bleeding, severe headache, or vision changes.  The patient verbalized understanding of the proper use and possible adverse effects of doxycycline.  All of the patient's questions and concerns were addressed. High Dose Vitamin A Pregnancy And Lactation Text: High dose vitamin A therapy is contraindicated during pregnancy and breast feeding. Tetracycline Counseling: Patient counseled regarding possible photosensitivity and increased risk for sunburn.  Patient instructed to avoid sunlight, if possible.  When exposed to sunlight, patients should wear protective clothing, sunglasses, and sunscreen.  The patient was instructed to call the office immediately if the following severe adverse effects occur:  hearing changes, easy bruising/bleeding, severe headache, or vision changes.  The patient verbalized understanding of the proper use and possible adverse effects of tetracycline.  All of the patient's questions and concerns were addressed. Patient understands to avoid pregnancy while on therapy due to potential birth defects. Benzoyl Peroxide Pregnancy And Lactation Text: This medication is Pregnancy Category C. It is unknown if benzoyl peroxide is excreted in breast milk. Azelaic Acid Pregnancy And Lactation Text: This medication is considered safe during pregnancy and breast feeding. Topical Clindamycin Counseling: Patient counseled that this medication may cause skin irritation or allergic reactions.  In the event of skin irritation, the patient was advised to reduce the amount of the drug applied or use it less frequently.   The patient verbalized understanding of the proper use and possible adverse effects of clindamycin.  All of the patient's questions and concerns were addressed. Dapsone Counseling: I discussed with the patient the risks of dapsone including but not limited to hemolytic anemia, agranulocytosis, rashes, methemoglobinemia, kidney failure, peripheral neuropathy, headaches, GI upset, and liver toxicity.  Patients who start dapsone require monitoring including baseline LFTs and weekly CBCs for the first month, then every month thereafter.  The patient verbalized understanding of the proper use and possible adverse effects of dapsone.  All of the patient's questions and concerns were addressed. Isotretinoin Pregnancy And Lactation Text: This medication is Pregnancy Category X and is considered extremely dangerous during pregnancy. It is unknown if it is excreted in breast milk. Spironolactone Counseling: Patient advised regarding risks of diarrhea, abdominal pain, hyperkalemia, birth defects (for female patients), liver toxicity and renal toxicity. The patient may need blood work to monitor liver and kidney function and potassium levels while on therapy. The patient verbalized understanding of the proper use and possible adverse effects of spironolactone.  All of the patient's questions and concerns were addressed. Erythromycin Pregnancy And Lactation Text: This medication is Pregnancy Category B and is considered safe during pregnancy. It is also excreted in breast milk. Sarecycline Counseling: Patient advised regarding possible photosensitivity and discoloration of the teeth, skin, lips, tongue and gums.  Patient instructed to avoid sunlight, if possible.  When exposed to sunlight, patients should wear protective clothing, sunglasses, and sunscreen.  The patient was instructed to call the office immediately if the following severe adverse effects occur:  hearing changes, easy bruising/bleeding, severe headache, or vision changes.  The patient verbalized understanding of the proper use and possible adverse effects of sarecycline.  All of the patient's questions and concerns were addressed. Aklief Pregnancy And Lactation Text: It is unknown if this medication is safe to use during pregnancy.  It is unknown if this medication is excreted in breast milk.  Breastfeeding women should use the topical cream on the smallest area of the skin for the shortest time needed while breastfeeding.  Do not apply to nipple and areola. Tazorac Counseling:  Patient advised that medication is irritating and drying.  Patient may need to apply sparingly and wash off after an hour before eventually leaving it on overnight.  The patient verbalized understanding of the proper use and possible adverse effects of tazorac.  All of the patient's questions and concerns were addressed. Bactrim Counseling:  I discussed with the patient the risks of sulfa antibiotics including but not limited to GI upset, allergic reaction, drug rash, diarrhea, dizziness, photosensitivity, and yeast infections.  Rarely, more serious reactions can occur including but not limited to aplastic anemia, agranulocytosis, methemoglobinemia, blood dyscrasias, liver or kidney failure, lung infiltrates or desquamative/blistering drug rashes. Winlevi Pregnancy And Lactation Text: This medication is considered safe during pregnancy and breastfeeding. Birth Control Pills Counseling: Birth Control Pill Counseling: I discussed with the patient the potential side effects of OCPs including but not limited to increased risk of stroke, heart attack, thrombophlebitis, deep venous thrombosis, hepatic adenomas, breast changes, GI upset, headaches, and depression.  The patient verbalized understanding of the proper use and possible adverse effects of OCPs. All of the patient's questions and concerns were addressed. Topical Retinoid counseling:  Patient advised to apply a pea-sized amount only at bedtime and wait 30 minutes after washing their face before applying.  If too drying, patient may add a non-comedogenic moisturizer. The patient verbalized understanding of the proper use and possible adverse effects of retinoids.  All of the patient's questions and concerns were addressed. Doxycycline Pregnancy And Lactation Text: This medication is Pregnancy Category D and not consider safe during pregnancy. It is also excreted in breast milk but is considered safe for shorter treatment courses. Minocycline Counseling: Patient advised regarding possible photosensitivity and discoloration of the teeth, skin, lips, tongue and gums.  Patient instructed to avoid sunlight, if possible.  When exposed to sunlight, patients should wear protective clothing, sunglasses, and sunscreen.  The patient was instructed to call the office immediately if the following severe adverse effects occur:  hearing changes, easy bruising/bleeding, severe headache, or vision changes.  The patient verbalized understanding of the proper use and possible adverse effects of minocycline.  All of the patient's questions and concerns were addressed. Topical Sulfur Applications Pregnancy And Lactation Text: This medication is Pregnancy Category C and has an unknown safety profile during pregnancy. It is unknown if this topical medication is excreted in breast milk. Dapsone Pregnancy And Lactation Text: This medication is Pregnancy Category C and is not considered safe during pregnancy or breast feeding. Topical Clindamycin Pregnancy And Lactation Text: This medication is Pregnancy Category B and is considered safe during pregnancy. It is unknown if it is excreted in breast milk. Azithromycin Counseling:  I discussed with the patient the risks of azithromycin including but not limited to GI upset, allergic reaction, drug rash, diarrhea, and yeast infections. Birth Control Pills Pregnancy And Lactation Text: This medication should be avoided if pregnant and for the first 30 days post-partum. High Dose Vitamin A Counseling: Side effects reviewed, pt to contact office should one occur. Benzoyl Peroxide Counseling: Patient counseled that medicine may cause skin irritation and bleach clothing.  In the event of skin irritation, the patient was advised to reduce the amount of the drug applied or use it less frequently.   The patient verbalized understanding of the proper use and possible adverse effects of benzoyl peroxide.  All of the patient's questions and concerns were addressed. Spironolactone Pregnancy And Lactation Text: This medication can cause feminization of the male fetus and should be avoided during pregnancy. The active metabolite is also found in breast milk. Isotretinoin Counseling: Patient should get monthly blood tests, not donate blood, not drive at night if vision affected, not share medication, and not undergo elective surgery for 6 months after tx completed. Side effects reviewed, pt to contact office should one occur. Azelaic Acid Counseling: Patient counseled that medicine may cause skin irritation and to avoid applying near the eyes.  In the event of skin irritation, the patient was advised to reduce the amount of the drug applied or use it less frequently.   The patient verbalized understanding of the proper use and possible adverse effects of azelaic acid.  All of the patient's questions and concerns were addressed. Tazorac Pregnancy And Lactation Text: This medication is not safe during pregnancy. It is unknown if this medication is excreted in breast milk. Detail Level: Detailed

## 2025-03-27 ENCOUNTER — APPOINTMENT (OUTPATIENT)
Dept: FAMILY MEDICINE | Facility: CLINIC | Age: 45
End: 2025-03-27
Payer: COMMERCIAL

## 2025-03-27 VITALS
HEIGHT: 65 IN | TEMPERATURE: 98.1 F | OXYGEN SATURATION: 99 % | DIASTOLIC BLOOD PRESSURE: 80 MMHG | BODY MASS INDEX: 32.65 KG/M2 | WEIGHT: 196 LBS | SYSTOLIC BLOOD PRESSURE: 114 MMHG | HEART RATE: 88 BPM

## 2025-03-27 DIAGNOSIS — E66.9 OBESITY, UNSPECIFIED: ICD-10-CM

## 2025-03-27 PROCEDURE — 99213 OFFICE O/P EST LOW 20 MIN: CPT

## 2025-03-27 PROCEDURE — G2211 COMPLEX E/M VISIT ADD ON: CPT | Mod: NC

## 2025-05-13 ENCOUNTER — APPOINTMENT (OUTPATIENT)
Dept: FAMILY MEDICINE | Facility: CLINIC | Age: 45
End: 2025-05-13
Payer: COMMERCIAL

## 2025-05-13 VITALS
HEIGHT: 65 IN | HEART RATE: 89 BPM | BODY MASS INDEX: 31.32 KG/M2 | SYSTOLIC BLOOD PRESSURE: 122 MMHG | DIASTOLIC BLOOD PRESSURE: 70 MMHG | TEMPERATURE: 96.7 F | OXYGEN SATURATION: 98 % | WEIGHT: 188 LBS

## 2025-05-13 DIAGNOSIS — E66.9 OBESITY, UNSPECIFIED: ICD-10-CM

## 2025-05-13 PROCEDURE — 99213 OFFICE O/P EST LOW 20 MIN: CPT

## 2025-05-29 ENCOUNTER — OFFICE (OUTPATIENT)
Dept: URBAN - METROPOLITAN AREA CLINIC 12 | Facility: CLINIC | Age: 45
Setting detail: OPHTHALMOLOGY
End: 2025-05-29
Payer: COMMERCIAL

## 2025-05-29 DIAGNOSIS — H01.004: ICD-10-CM

## 2025-05-29 DIAGNOSIS — H01.001: ICD-10-CM

## 2025-05-29 PROCEDURE — 92012 INTRM OPH EXAM EST PATIENT: CPT | Performed by: OPHTHALMOLOGY

## 2025-05-29 ASSESSMENT — REFRACTION_MANIFEST
OD_CYLINDER: -0.75
OS_VA1: 20/20-2
OD_VA1: 20/25-2
OS_SPHERE: -0.25
OS_AXIS: 005
OD_SPHERE: -0.25
OS_CYLINDER: -1.50
OD_AXIS: 175

## 2025-05-29 ASSESSMENT — VISUAL ACUITY
OD_BCVA: 20/40
OS_BCVA: 20/40

## 2025-05-29 ASSESSMENT — LID EXAM ASSESSMENTS
OS_BLEPHARITIS: 1+
OD_BLEPHARITIS: 1+

## 2025-05-29 ASSESSMENT — REFRACTION_AUTOREFRACTION
OS_AXIS: 010
OS_SPHERE: PLANO
OD_AXIS: 180
OD_CYLINDER: -1.25
OS_CYLINDER: -1.25
OD_SPHERE: PLANO

## 2025-05-29 ASSESSMENT — CONFRONTATIONAL VISUAL FIELD TEST (CVF)
OS_FINDINGS: FULL
OD_FINDINGS: FULL

## 2025-05-29 ASSESSMENT — SUPERFICIAL PUNCTATE KERATITIS (SPK)
OS_SPK: T
OD_SPK: T

## 2025-05-29 ASSESSMENT — KERATOMETRY
OS_AXISANGLE_DEGREES: 097
OS_K1POWER_DIOPTERS: 43.50
OD_K1POWER_DIOPTERS: 43.25
OD_AXISANGLE_DEGREES: 083
METHOD_AUTO_MANUAL: AUTO
OS_K2POWER_DIOPTERS: 45.25
OD_K2POWER_DIOPTERS: 45.00

## 2025-05-29 ASSESSMENT — TONOMETRY
OS_IOP_MMHG: 12
OD_IOP_MMHG: 13

## 2025-06-06 ENCOUNTER — OFFICE (OUTPATIENT)
Dept: URBAN - METROPOLITAN AREA CLINIC 100 | Facility: CLINIC | Age: 45
Setting detail: OPHTHALMOLOGY
End: 2025-06-06
Payer: COMMERCIAL

## 2025-06-06 DIAGNOSIS — L71.0: ICD-10-CM

## 2025-06-06 DIAGNOSIS — B88.0: ICD-10-CM

## 2025-06-06 DIAGNOSIS — H01.001: ICD-10-CM

## 2025-06-06 DIAGNOSIS — H01.004: ICD-10-CM

## 2025-06-06 PROBLEM — H16.223 DRY EYE SYNDROME K SICCA; BOTH EYES: Status: ACTIVE | Noted: 2025-06-06

## 2025-06-06 PROCEDURE — 99213 OFFICE O/P EST LOW 20 MIN: CPT | Performed by: STUDENT IN AN ORGANIZED HEALTH CARE EDUCATION/TRAINING PROGRAM

## 2025-06-06 ASSESSMENT — LID EXAM ASSESSMENTS
OD_BLEPHARITIS: RUL 1+ 2+
OS_BLEPHARITIS: LUL 1+ 2+

## 2025-06-06 ASSESSMENT — SUPERFICIAL PUNCTATE KERATITIS (SPK)
OS_SPK: T
OD_SPK: T

## 2025-06-06 ASSESSMENT — CONFRONTATIONAL VISUAL FIELD TEST (CVF)
OD_FINDINGS: FULL
OS_FINDINGS: FULL

## 2025-06-08 ASSESSMENT — REFRACTION_AUTOREFRACTION
OS_AXIS: 010
OD_SPHERE: PLANO
OD_AXIS: 180
OD_CYLINDER: -1.25
OS_CYLINDER: -1.25
OS_SPHERE: PLANO

## 2025-06-08 ASSESSMENT — VISUAL ACUITY
OD_BCVA: 20/20-2
OS_BCVA: 20/40-3

## 2025-06-08 ASSESSMENT — KERATOMETRY
OS_AXISANGLE_DEGREES: 097
OS_K1POWER_DIOPTERS: 43.50
OD_K2POWER_DIOPTERS: 45.00
OD_AXISANGLE_DEGREES: 083
OS_K2POWER_DIOPTERS: 45.25
METHOD_AUTO_MANUAL: AUTO
OD_K1POWER_DIOPTERS: 43.25

## 2025-06-19 PROBLEM — H43.393 VITREOUS FLOATERS; BOTH EYES: Status: ACTIVE | Noted: 2025-06-19

## 2025-08-08 ENCOUNTER — OFFICE (OUTPATIENT)
Dept: URBAN - METROPOLITAN AREA CLINIC 100 | Facility: CLINIC | Age: 45
Setting detail: OPHTHALMOLOGY
End: 2025-08-08
Payer: COMMERCIAL

## 2025-08-08 DIAGNOSIS — B88.0: ICD-10-CM

## 2025-08-08 DIAGNOSIS — H01.001: ICD-10-CM

## 2025-08-08 DIAGNOSIS — H01.004: ICD-10-CM

## 2025-08-08 DIAGNOSIS — L71.0: ICD-10-CM

## 2025-08-08 PROBLEM — H04.123 DES- DRY EYES; BOTH EYES: Status: ACTIVE | Noted: 2025-08-08

## 2025-08-08 PROCEDURE — 99213 OFFICE O/P EST LOW 20 MIN: CPT | Performed by: STUDENT IN AN ORGANIZED HEALTH CARE EDUCATION/TRAINING PROGRAM

## 2025-08-08 ASSESSMENT — REFRACTION_AUTOREFRACTION
OS_SPHERE: PLANO
OD_CYLINDER: -1.25
OS_AXIS: 010
OD_SPHERE: PLANO
OD_AXIS: 180
OS_CYLINDER: -1.25

## 2025-08-08 ASSESSMENT — KERATOMETRY
METHOD_AUTO_MANUAL: AUTO
OS_AXISANGLE_DEGREES: 097
OD_K2POWER_DIOPTERS: 45.00
OD_K1POWER_DIOPTERS: 43.25
OS_K1POWER_DIOPTERS: 43.50
OD_AXISANGLE_DEGREES: 083
OS_K2POWER_DIOPTERS: 45.25

## 2025-08-08 ASSESSMENT — CONFRONTATIONAL VISUAL FIELD TEST (CVF)
OD_FINDINGS: FULL
OS_FINDINGS: FULL

## 2025-08-08 ASSESSMENT — LID EXAM ASSESSMENTS
OS_BLEPHARITIS: LUL 1+
OD_BLEPHARITIS: RUL 1+

## 2025-08-08 ASSESSMENT — SUPERFICIAL PUNCTATE KERATITIS (SPK)
OS_SPK: T
OD_SPK: T

## 2025-08-08 ASSESSMENT — VISUAL ACUITY
OS_BCVA: 20/40+1
OD_BCVA: 20/20-1

## 2025-08-14 ENCOUNTER — APPOINTMENT (OUTPATIENT)
Dept: FAMILY MEDICINE | Facility: CLINIC | Age: 45
End: 2025-08-14
Payer: COMMERCIAL

## 2025-08-14 VITALS
OXYGEN SATURATION: 98 % | DIASTOLIC BLOOD PRESSURE: 70 MMHG | HEIGHT: 65 IN | BODY MASS INDEX: 28.32 KG/M2 | HEART RATE: 91 BPM | SYSTOLIC BLOOD PRESSURE: 90 MMHG | WEIGHT: 170 LBS | RESPIRATION RATE: 14 BRPM | TEMPERATURE: 97.3 F

## 2025-08-14 DIAGNOSIS — R79.89 OTHER SPECIFIED ABNORMAL FINDINGS OF BLOOD CHEMISTRY: ICD-10-CM

## 2025-08-14 DIAGNOSIS — E04.1 NONTOXIC SINGLE THYROID NODULE: ICD-10-CM

## 2025-08-14 DIAGNOSIS — Z13.29 ENCOUNTER FOR SCREENING FOR OTHER SUSPECTED ENDOCRINE DISORDER: ICD-10-CM

## 2025-08-14 DIAGNOSIS — Z13.220 ENCOUNTER FOR SCREENING FOR LIPOID DISORDERS: ICD-10-CM

## 2025-08-14 DIAGNOSIS — Z13.0 ENCOUNTER FOR SCREENING FOR OTHER SUSPECTED ENDOCRINE DISORDER: ICD-10-CM

## 2025-08-14 DIAGNOSIS — D50.9 IRON DEFICIENCY ANEMIA, UNSPECIFIED: ICD-10-CM

## 2025-08-14 DIAGNOSIS — E55.9 VITAMIN D DEFICIENCY, UNSPECIFIED: ICD-10-CM

## 2025-08-14 DIAGNOSIS — E66.3 OVERWEIGHT: ICD-10-CM

## 2025-08-14 DIAGNOSIS — Z13.228 ENCOUNTER FOR SCREENING FOR OTHER SUSPECTED ENDOCRINE DISORDER: ICD-10-CM

## 2025-08-14 PROCEDURE — 99214 OFFICE O/P EST MOD 30 MIN: CPT

## 2025-08-15 ENCOUNTER — OUTPATIENT (OUTPATIENT)
Dept: OUTPATIENT SERVICES | Facility: HOSPITAL | Age: 45
LOS: 1 days | End: 2025-08-15
Payer: COMMERCIAL

## 2025-08-15 ENCOUNTER — APPOINTMENT (OUTPATIENT)
Dept: ULTRASOUND IMAGING | Facility: CLINIC | Age: 45
End: 2025-08-15

## 2025-08-15 DIAGNOSIS — Z98.89 OTHER SPECIFIED POSTPROCEDURAL STATES: Chronic | ICD-10-CM

## 2025-08-15 DIAGNOSIS — Z87.42 PERSONAL HISTORY OF OTHER DISEASES OF THE FEMALE GENITAL TRACT: Chronic | ICD-10-CM

## 2025-08-15 DIAGNOSIS — E04.1 NONTOXIC SINGLE THYROID NODULE: ICD-10-CM

## 2025-08-15 PROCEDURE — 76536 US EXAM OF HEAD AND NECK: CPT | Mod: 26

## 2025-08-15 PROCEDURE — 76536 US EXAM OF HEAD AND NECK: CPT
